# Patient Record
Sex: MALE
[De-identification: names, ages, dates, MRNs, and addresses within clinical notes are randomized per-mention and may not be internally consistent; named-entity substitution may affect disease eponyms.]

---

## 2018-12-16 ENCOUNTER — HOSPITAL ENCOUNTER (EMERGENCY)
Dept: HOSPITAL 42 - ED | Age: 28
Discharge: HOME | End: 2018-12-16
Payer: COMMERCIAL

## 2018-12-16 VITALS — TEMPERATURE: 98.2 F | OXYGEN SATURATION: 99 % | RESPIRATION RATE: 18 BRPM

## 2018-12-16 VITALS — BODY MASS INDEX: 30.4 KG/M2

## 2018-12-16 VITALS — DIASTOLIC BLOOD PRESSURE: 80 MMHG | SYSTOLIC BLOOD PRESSURE: 148 MMHG | HEART RATE: 71 BPM

## 2018-12-16 DIAGNOSIS — F41.9: ICD-10-CM

## 2018-12-16 DIAGNOSIS — R07.9: Primary | ICD-10-CM

## 2018-12-16 LAB
ALBUMIN SERPL-MCNC: 4.8 G/DL (ref 3–4.8)
ALBUMIN/GLOB SERPL: 1.5 {RATIO} (ref 1.1–1.8)
ALT SERPL-CCNC: 27 U/L (ref 7–56)
AST SERPL-CCNC: 27 U/L (ref 17–59)
BASOPHILS # BLD AUTO: 0.03 K/MM3 (ref 0–2)
BASOPHILS NFR BLD: 0.6 % (ref 0–3)
BUN SERPL-MCNC: 20 MG/DL (ref 7–21)
CALCIUM SERPL-MCNC: 9.8 MG/DL (ref 8.4–10.5)
EOSINOPHIL # BLD: 0.1 10*3/UL (ref 0–0.7)
EOSINOPHIL NFR BLD: 2.1 % (ref 1.5–5)
ERYTHROCYTE [DISTWIDTH] IN BLOOD BY AUTOMATED COUNT: 13.1 % (ref 11.5–14.5)
ERYTHROCYTE [SEDIMENTATION RATE] IN BLOOD: 2 MM/HR (ref 0–15)
GFR NON-AFRICAN AMERICAN: > 60
GRANULOCYTES # BLD: 3.07 10*3/UL (ref 1.4–6.5)
GRANULOCYTES NFR BLD: 58.1 % (ref 50–68)
HGB BLD-MCNC: 15.2 G/DL (ref 14–18)
LYMPHOCYTES # BLD: 1.7 10*3/UL (ref 1.2–3.4)
LYMPHOCYTES NFR BLD AUTO: 32.2 % (ref 22–35)
MCH RBC QN AUTO: 28 PG (ref 25–35)
MCHC RBC AUTO-ENTMCNC: 35.3 G/DL (ref 31–37)
MCV RBC AUTO: 79.3 FL (ref 80–105)
MONOCYTES # BLD AUTO: 0.4 10*3/UL (ref 0.1–0.6)
MONOCYTES NFR BLD: 7 % (ref 1–6)
PLATELET # BLD: 163 10^3/UL (ref 120–450)
PMV BLD AUTO: 9.6 FL (ref 7–11)
RBC # BLD AUTO: 5.42 10^6/UL (ref 3.5–6.1)
TROPONIN I SERPL-MCNC: < 0.01 NG/ML
WBC # BLD AUTO: 5.3 10^3/UL (ref 4.5–11)

## 2018-12-16 PROCEDURE — 96374 THER/PROPH/DIAG INJ IV PUSH: CPT

## 2018-12-16 PROCEDURE — 85025 COMPLETE CBC W/AUTO DIFF WBC: CPT

## 2018-12-16 PROCEDURE — 71045 X-RAY EXAM CHEST 1 VIEW: CPT

## 2018-12-16 PROCEDURE — 99283 EMERGENCY DEPT VISIT LOW MDM: CPT

## 2018-12-16 PROCEDURE — 84484 ASSAY OF TROPONIN QUANT: CPT

## 2018-12-16 PROCEDURE — 85651 RBC SED RATE NONAUTOMATED: CPT

## 2018-12-16 PROCEDURE — 80053 COMPREHEN METABOLIC PANEL: CPT

## 2018-12-16 PROCEDURE — 93005 ELECTROCARDIOGRAM TRACING: CPT

## 2018-12-16 PROCEDURE — 96361 HYDRATE IV INFUSION ADD-ON: CPT

## 2018-12-16 PROCEDURE — 83735 ASSAY OF MAGNESIUM: CPT

## 2018-12-16 NOTE — ED PDOC
Arrival/HPI





- General


Chief Complaint: Chest Pain


Historian: Patient





- History of Present Illness


Narrative History of Present Illness (Text): 





28 year old male, with no significant past medical history, who presents to the 

Emergency department complaining of intermittent chest pain described as 

burning/pressure for the past 3-4 days, with sudden onset. Patient notes similar

symptoms in the past couple of months which was self-limited in nature without 

use of medication. 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 

heavy manual labor. Patient notes he has been having headaches for the past few 

days, and says he took Aspirin with significant relief. Patient denies any 

noticeable correlation between certain foods and chest pain. Patient denies any 

fevers, chills, pain to the touch or with movement, or any other complaints. 


Time/Duration: > week (Patient notes onset as past 3-4 days )


Symptom Onset: Sudden


Symptom Course: Unchanged


Quality: Pressure (pt describes chest pain as pressure), Burning (pt describes 

chest pain as burning)


Severity Level: Mild


Activities at Onset: Rest


Context: Home, Work





Past Medical History





- Provider Review


Nursing Documentation Reviewed: Yes





- Travel History


Have you recently traveled outside [place] w/in the past 3 mons?: No





- Psychiatric


Hx Substance Use: No





Family/Social History





- Physician Review


Nursing Documentation Reviewed: Yes


Family/Social History: No Known Family HX


Smoking Status: Never Smoked


Hx Alcohol Use: No


Hx Substance Use: No





Allergies/Home Meds


Allergies/Adverse Reactions: 


Allergies





No Known Allergies Allergy (Verified 12/16/18 15:39)


   











Review of Systems





- Physician Review


All systems were reviewed & negative as marked: Yes





- Review of Systems


Constitutional: Normal.  absent: Fevers, Night Sweats


Respiratory: SOB (Pt notes shortness of breath caused by work ).  absent: Normal


Cardiovascular: Chest Pain (Pt notes intermittent chest pain for past 3-4 days 

).  absent: Normal


Neurological: Headache (Pt states he has been having headaches for the past few 

days ).  absent: Normal





Physical Exam


Vital Signs Reviewed: Yes


Temperature: Afebrile


Blood Pressure: Normal


Pulse: Regular


Respiratory Rate: Normal


Appearance: Positive for: Well-Appearing, Non-Toxic


Pain Distress: Mild


Mental Status: Positive for: Alert and Oriented X 3





- Systems Exam


Head: Present: Atraumatic, Normocephalic


Pupils: Present: PERRL


Extroacular Muscles: Present: EOMI


Conjunctiva: Present: Normal


Mouth: Present: Moist Mucous Membranes


Neck: Present: Normal Range of Motion


Respiratory/Chest: Present: Clear to Auscultation, Good Air Exchange


Cardiovascular: Present: Regular Rate and Rhythm, Normal S1, S2


Abdomen: Present: Normal Bowel Sounds


Upper Extremity: Present: Normal Inspection, Neurovascularly Intact, Capillary 

Refill < 2s


Lower Extremity: Present: Normal Inspection, Neurovascularly Intact, Capillary 

Refill < 2 s


Neurological: Present: GCS=15, CN II-XII Intact, Speech Normal


Skin: Present: Warm, Dry, Normal Color


Psychiatric: Present: Alert, Oriented x 3, Normal Insight, Normal Concentration





Medical Decision Making


ED Course and Treatment: 





12/16/18 16:05


Impression


28 year old M w/ intermittent chest pain 





HEART Score: 1


PERC Negative





Differential Diagnosis Includes But Is Not Limited To:


--Pericarditis/Myocarditis


--PE


--ACS





Plan


--Labs


--Chest X-ray


--NSS


--Toradol


--EKG


--Reassess & disposition





Progress Notes


12/16/18 18:20


Labs reviewed with troponin WNL as well as D-dimer. Discussion with patient for 

appropriate cardiology follow up who agrees. He reports improvement in chest 

pain and attributes symptoms to anxiety. He states he will follow up with his 

PCP. He is stable for discharge. 








- Lab Interpretations


Lab Results: 














                                 12/16/18 16:11 





                                 12/16/18 16:11 





                                   Lab Results





12/16/18 16:11: Sodium 139, Potassium 4.6, Chloride 102, Carbon Dioxide 28, 

Anion Gap 13, BUN 20, Creatinine 1.1, Est GFR (African Amer) > 60, Est GFR (Non-

Af Amer) > 60, Random Glucose 93, Calcium 9.8, Magnesium 2.0, Total Bilirubin 

0.6, AST 27, ALT 27, Alkaline Phosphatase 61, Troponin I < 0.01, Total Protein 

8.0, Albumin 4.8, Globulin 3.2, Albumin/Globulin Ratio 1.5


12/16/18 16:11: WBC 5.3, RBC 5.42, Hgb 15.2, Hct 43.0, MCV 79.3 L, MCH 28.0, 

MCHC 35.3, RDW 13.1, Plt Count 163, MPV 9.6, Gran % 58.1, Lymph % (Auto) 32.2, 

Mono % (Auto) 7.0 H, Eos % (Auto) 2.1, Baso % (Auto) 0.6, Gran # 3.07, Lymph # 

(Auto) 1.7, Mono # (Auto) 0.4, Eos # (Auto) 0.1, Baso # (Auto) 0.03, ESR 2








I have reviewed the lab results: Yes





- RAD Interpretation


Narrative RAD Interpretations (Text): 





X-ray of chest reviewed by radiologist, shows: 





Dictator : Ana Cristina Singh MD


Report Date : 12/16/2018 16:54:11





IMPRESSION:


Hypoinflation.





Radiology Orders: 











12/16/18 16:02


CHEST PORTABLE [RAD] Stat 











: Radiologist





- EKG Interpretation


EKG Interpretation (Text): 





NSR at 73 bpm. 


Sinus arrhythmia. 


No ST elevations. 


No T wave inversions.








Interpreted by ED Physician: Yes


Type: 12 lead EKG





- Medication Orders


Current Medication Orders: 











Sodium Chloride (Sodium Chloride 0.9%)  1,000 mls @ 999 mls/hr IV .Q1H1M STA


   Stop: 12/16/18 17:03











- Scribe Statement


The provider has reviewed the documentation as recorded by the Scribe





Kaitlynn Longoria 





All medical record entries made by the Scribe were at my direction and 

personally dictated by me. I have reviewed the chart and agree that the record 

accurately reflects my personal performance of the history, physical exam, 

medical decision making, and the department course for this patient. I have also

 personally directed, reviewed, and agree with the discharge instructions and 

disposition.





Disposition/Present on Arrival





- Present on Arrival


Any Indicators Present on Arrival: No


History of DVT/PE: No


History of Uncontrolled Diabetes: No


Urinary Catheter: No


History of Decub. Ulcer: No


History Surgical Site Infection Following: None





- Disposition


Have Diagnosis and Disposition been Completed?: Yes


Diagnosis: 


 Chest pain, Anxiety





Disposition: HOME/ ROUTINE


Disposition Time: 18:23


Patient Plan: Discharge


Condition: IMPROVED


Discharge Instructions (ExitCare):  Anxiety, Adult (DC), Chest Pain (ED)


Print Language: ENGLISH


Additional Instructions: 





All medical record entries made by the Scribe were at my direction and 

personally dictated by me. I have reviewed the chart and agree that the record 

accurately reflects my personal performance of the history, physical exam, 

medical decision making, and the department course for this patient. I have also

 personally directed, reviewed, and agree with the discharge instructions and 

disposition.


Prescriptions: 


RX: Ibuprofen [Motrin Tab] 600 mg PO Q6H PRN 6 Days #24 tab


 PRN Reason: Pain, Moderate (4-7)


Referrals: 


Danita Nolan MD [Medical Doctor] - Follow up with primary


[facility] [Outside] - Follow up with primary


Forms:  [employer] (English), WORK NOTE

## 2018-12-16 NOTE — RAD
HISTORY:

 chest pain 



COMPARISON:

No prior. 



TECHNIQUE:

Chest, one view.



FINDINGS:

Examination limited by habitus and hypoinflation.



LUNGS:

No focal consolidation.



Please note that chest x-ray has limited sensitivity for the 

detection of pulmonary masses.



PLEURA:

No significant pleural effusion identified. No definite pneumothorax .



CARDIOVASCULAR:

Heart size appears top normal.  No significant atherosclerotic 

calcification present. 



OSSEOUS STRUCTURES:

No acute osseous abnormality identified.



VISUALIZED UPPER ABDOMEN:

Mild elevation of the right hemidiaphragm.



OTHER FINDINGS:

None.



IMPRESSION:

Hypoinflation.

## 2018-12-17 NOTE — CARD
--------------- APPROVED REPORT --------------





Date of service: 12/16/2018



EKG Measurement

Heart [id number]

SC [id number]

[id number]

[id number]

[id number]



<Conclusion>

Sinus rhythm with marked sinus arrhythmia

Otherwise normal ECG